# Patient Record
Sex: FEMALE | URBAN - METROPOLITAN AREA
[De-identification: names, ages, dates, MRNs, and addresses within clinical notes are randomized per-mention and may not be internally consistent; named-entity substitution may affect disease eponyms.]

---

## 2020-12-29 ENCOUNTER — OFFICE VISIT (OUTPATIENT)
Dept: URBAN - METROPOLITAN AREA CLINIC 70 | Facility: CLINIC | Age: 50
End: 2020-12-29

## 2021-01-08 ENCOUNTER — OFFICE VISIT (OUTPATIENT)
Dept: URBAN - METROPOLITAN AREA CLINIC 70 | Facility: CLINIC | Age: 51
End: 2021-01-08

## 2021-01-15 ENCOUNTER — OFFICE VISIT (OUTPATIENT)
Dept: URBAN - METROPOLITAN AREA CLINIC 70 | Facility: CLINIC | Age: 51
End: 2021-01-15

## 2022-03-21 ENCOUNTER — WEB ENCOUNTER (OUTPATIENT)
Dept: URBAN - METROPOLITAN AREA CLINIC 70 | Facility: CLINIC | Age: 52
End: 2022-03-21

## 2022-03-21 ENCOUNTER — LAB OUTSIDE AN ENCOUNTER (OUTPATIENT)
Dept: URBAN - METROPOLITAN AREA CLINIC 70 | Facility: CLINIC | Age: 52
End: 2022-03-21

## 2022-03-21 ENCOUNTER — OFFICE VISIT (OUTPATIENT)
Dept: URBAN - METROPOLITAN AREA CLINIC 70 | Facility: CLINIC | Age: 52
End: 2022-03-21
Payer: COMMERCIAL

## 2022-03-21 ENCOUNTER — DASHBOARD ENCOUNTERS (OUTPATIENT)
Age: 52
End: 2022-03-21

## 2022-03-21 DIAGNOSIS — Z12.11 COLON CANCER SCREENING: ICD-10-CM

## 2022-03-21 PROCEDURE — 99202 OFFICE O/P NEW SF 15 MIN: CPT | Performed by: REGISTERED NURSE

## 2022-03-21 RX ORDER — MELOXICAM 7.5 MG/1
TAKE 1 TABLET BY MOUTH TWICE A DAY TABLET ORAL
Qty: 60 | Refills: 0 | Status: ACTIVE | COMMUNITY

## 2022-03-21 RX ORDER — LATANOPROST 50 UG/ML
INSTILL 1 DROP BY OPHTHALMIC ROUTE EVERY DAY INTO BOTH EYES IN THE EVENING SOLUTION OPHTHALMIC
Qty: 2.5 | Refills: 1 | Status: ACTIVE | COMMUNITY

## 2022-03-21 RX ORDER — ERGOCALCIFEROL 1.25 MG/1
TAKE 1 CAPSULE BY MOUTH ONCE EVERY WEEK CAPSULE ORAL
Qty: 12 | Refills: 0 | Status: ACTIVE | COMMUNITY

## 2022-03-21 RX ORDER — METFORMIN HYDROCHLORIDE 1000 MG/1
TAKE 1 TABLET BY MOUTH TWICE A DAY WITH A MEAL TABLET ORAL
Qty: 180 | Refills: 0 | Status: ACTIVE | COMMUNITY

## 2022-03-21 RX ORDER — CYCLOBENZAPRINE HYDROCHLORIDE 10 MG/1
TAKE 1 TABLET BY MOUTH ONCE EVERY DAY TABLET, FILM COATED ORAL
Qty: 30 | Refills: 0 | Status: ACTIVE | COMMUNITY

## 2022-03-21 RX ORDER — SIMVASTATIN 20 MG/1
TAKE 1 TABLET BY MOUTH EVERY EVENING TABLET, FILM COATED ORAL
Qty: 90 | Refills: 0 | Status: ACTIVE | COMMUNITY

## 2022-03-21 RX ORDER — ALBUTEROL SULFATE 90 UG/1
INHALE 2 PUFFS BY MOUTH EVERY 4 HOURS AS NEEDED AEROSOL, METERED RESPIRATORY (INHALATION)
Qty: 18 | Refills: 2 | Status: ACTIVE | COMMUNITY

## 2022-03-21 RX ORDER — OXYCODONE AND ACETAMINOPHEN 10; 325 MG/1; MG/1
TAKE 1 TABLET BY MOUTH FOUR TIMES A DAY TABLET ORAL
Qty: 120 | Refills: 0 | Status: ACTIVE | COMMUNITY

## 2022-03-21 RX ORDER — ATENOLOL AND CHLORTHALIDONE 50; 25 MG/1; MG/1
TAKE 1 TABLET BY MOUTH ONCE EVERY DAY TABLET ORAL
Qty: 90 | Refills: 0 | Status: ACTIVE | COMMUNITY

## 2022-03-21 NOTE — HPI-TODAY'S VISIT:
Pt presents for colon cancer screening. No prior colonoscopy. No family history of colon cancer. Pt denies any abdominal pain, diarrhea, constipation, rectal bleeding or weight loss.
not examined

## 2022-04-13 ENCOUNTER — OFFICE VISIT (OUTPATIENT)
Dept: URBAN - METROPOLITAN AREA SURGERY CENTER 24 | Facility: SURGERY CENTER | Age: 52
End: 2022-04-13
Payer: COMMERCIAL

## 2022-04-13 DIAGNOSIS — Z12.11 COLON CANCER SCREENING: ICD-10-CM

## 2022-04-13 PROCEDURE — 992 APS NON BILLABLE: Performed by: INTERNAL MEDICINE

## 2022-04-13 RX ORDER — ALBUTEROL SULFATE 90 UG/1
INHALE 2 PUFFS BY MOUTH EVERY 4 HOURS AS NEEDED AEROSOL, METERED RESPIRATORY (INHALATION)
Qty: 18 | Refills: 2 | Status: ACTIVE | COMMUNITY

## 2022-04-13 RX ORDER — METFORMIN HYDROCHLORIDE 1000 MG/1
TAKE 1 TABLET BY MOUTH TWICE A DAY WITH A MEAL TABLET ORAL
Qty: 180 | Refills: 0 | Status: ACTIVE | COMMUNITY

## 2022-04-13 RX ORDER — ATENOLOL AND CHLORTHALIDONE 50; 25 MG/1; MG/1
TAKE 1 TABLET BY MOUTH ONCE EVERY DAY TABLET ORAL
Qty: 90 | Refills: 0 | Status: ACTIVE | COMMUNITY

## 2022-04-13 RX ORDER — ERGOCALCIFEROL 1.25 MG/1
TAKE 1 CAPSULE BY MOUTH ONCE EVERY WEEK CAPSULE ORAL
Qty: 12 | Refills: 0 | Status: ACTIVE | COMMUNITY

## 2022-04-13 RX ORDER — MELOXICAM 7.5 MG/1
TAKE 1 TABLET BY MOUTH TWICE A DAY TABLET ORAL
Qty: 60 | Refills: 0 | Status: ACTIVE | COMMUNITY

## 2022-04-13 RX ORDER — CYCLOBENZAPRINE HYDROCHLORIDE 10 MG/1
TAKE 1 TABLET BY MOUTH ONCE EVERY DAY TABLET, FILM COATED ORAL
Qty: 30 | Refills: 0 | Status: ACTIVE | COMMUNITY

## 2022-04-13 RX ORDER — OXYCODONE AND ACETAMINOPHEN 10; 325 MG/1; MG/1
TAKE 1 TABLET BY MOUTH FOUR TIMES A DAY TABLET ORAL
Qty: 120 | Refills: 0 | Status: ACTIVE | COMMUNITY

## 2022-04-13 RX ORDER — LATANOPROST 50 UG/ML
INSTILL 1 DROP BY OPHTHALMIC ROUTE EVERY DAY INTO BOTH EYES IN THE EVENING SOLUTION OPHTHALMIC
Qty: 2.5 | Refills: 1 | Status: ACTIVE | COMMUNITY

## 2022-04-13 RX ORDER — SIMVASTATIN 20 MG/1
TAKE 1 TABLET BY MOUTH EVERY EVENING TABLET, FILM COATED ORAL
Qty: 90 | Refills: 0 | Status: ACTIVE | COMMUNITY

## 2022-06-03 ENCOUNTER — OFFICE VISIT (OUTPATIENT)
Dept: URBAN - METROPOLITAN AREA SURGERY CENTER 24 | Facility: SURGERY CENTER | Age: 52
End: 2022-06-03
Payer: COMMERCIAL

## 2022-06-03 ENCOUNTER — CLAIMS CREATED FROM THE CLAIM WINDOW (OUTPATIENT)
Dept: URBAN - METROPOLITAN AREA CLINIC 4 | Facility: CLINIC | Age: 52
End: 2022-06-03
Payer: COMMERCIAL

## 2022-06-03 DIAGNOSIS — D12.5 BENIGN NEOPLASM OF SIGMOID COLON: ICD-10-CM

## 2022-06-03 DIAGNOSIS — Z12.11 COLON CANCER SCREENING: ICD-10-CM

## 2022-06-03 DIAGNOSIS — D12.5 ADENOMA OF SIGMOID COLON: ICD-10-CM

## 2022-06-03 PROCEDURE — 88305 TISSUE EXAM BY PATHOLOGIST: CPT | Performed by: PATHOLOGY

## 2022-06-03 PROCEDURE — G8907 PT DOC NO EVENTS ON DISCHARG: HCPCS | Performed by: INTERNAL MEDICINE

## 2022-06-03 PROCEDURE — 45385 COLONOSCOPY W/LESION REMOVAL: CPT | Performed by: INTERNAL MEDICINE

## 2022-06-03 RX ORDER — LATANOPROST 50 UG/ML
INSTILL 1 DROP BY OPHTHALMIC ROUTE EVERY DAY INTO BOTH EYES IN THE EVENING SOLUTION OPHTHALMIC
Qty: 2.5 | Refills: 1 | Status: ACTIVE | COMMUNITY

## 2022-06-03 RX ORDER — ALBUTEROL SULFATE 90 UG/1
INHALE 2 PUFFS BY MOUTH EVERY 4 HOURS AS NEEDED AEROSOL, METERED RESPIRATORY (INHALATION)
Qty: 18 | Refills: 2 | Status: ACTIVE | COMMUNITY

## 2022-06-03 RX ORDER — OXYCODONE AND ACETAMINOPHEN 10; 325 MG/1; MG/1
TAKE 1 TABLET BY MOUTH FOUR TIMES A DAY TABLET ORAL
Qty: 120 | Refills: 0 | Status: ACTIVE | COMMUNITY

## 2022-06-03 RX ORDER — SIMVASTATIN 20 MG/1
TAKE 1 TABLET BY MOUTH EVERY EVENING TABLET, FILM COATED ORAL
Qty: 90 | Refills: 0 | Status: ACTIVE | COMMUNITY

## 2022-06-03 RX ORDER — MELOXICAM 7.5 MG/1
TAKE 1 TABLET BY MOUTH TWICE A DAY TABLET ORAL
Qty: 60 | Refills: 0 | Status: ACTIVE | COMMUNITY

## 2022-06-03 RX ORDER — CYCLOBENZAPRINE HYDROCHLORIDE 10 MG/1
TAKE 1 TABLET BY MOUTH ONCE EVERY DAY TABLET, FILM COATED ORAL
Qty: 30 | Refills: 0 | Status: ACTIVE | COMMUNITY

## 2022-06-03 RX ORDER — ATENOLOL AND CHLORTHALIDONE 50; 25 MG/1; MG/1
TAKE 1 TABLET BY MOUTH ONCE EVERY DAY TABLET ORAL
Qty: 90 | Refills: 0 | Status: ACTIVE | COMMUNITY

## 2022-06-03 RX ORDER — ERGOCALCIFEROL 1.25 MG/1
TAKE 1 CAPSULE BY MOUTH ONCE EVERY WEEK CAPSULE ORAL
Qty: 12 | Refills: 0 | Status: ACTIVE | COMMUNITY

## 2022-06-03 RX ORDER — METFORMIN HYDROCHLORIDE 1000 MG/1
TAKE 1 TABLET BY MOUTH TWICE A DAY WITH A MEAL TABLET ORAL
Qty: 180 | Refills: 0 | Status: ACTIVE | COMMUNITY